# Patient Record
Sex: FEMALE | Race: WHITE | HISPANIC OR LATINO | Employment: UNEMPLOYED | URBAN - METROPOLITAN AREA
[De-identification: names, ages, dates, MRNs, and addresses within clinical notes are randomized per-mention and may not be internally consistent; named-entity substitution may affect disease eponyms.]

---

## 2024-01-02 ENCOUNTER — HOSPITAL ENCOUNTER (EMERGENCY)
Facility: HOSPITAL | Age: 29
Discharge: HOME | End: 2024-01-02
Payer: COMMERCIAL

## 2024-01-02 VITALS
OXYGEN SATURATION: 98 % | HEART RATE: 97 BPM | BODY MASS INDEX: 29.39 KG/M2 | DIASTOLIC BLOOD PRESSURE: 73 MMHG | WEIGHT: 140 LBS | HEIGHT: 58 IN | TEMPERATURE: 97 F | SYSTOLIC BLOOD PRESSURE: 110 MMHG | RESPIRATION RATE: 18 BRPM

## 2024-01-02 DIAGNOSIS — J10.1 INFLUENZA A: Primary | ICD-10-CM

## 2024-01-02 LAB
FLUAV RNA RESP QL NAA+PROBE: DETECTED
FLUBV RNA RESP QL NAA+PROBE: NOT DETECTED
SARS-COV-2 RNA RESP QL NAA+PROBE: NOT DETECTED

## 2024-01-02 PROCEDURE — 87636 SARSCOV2 & INF A&B AMP PRB: CPT | Performed by: PHYSICIAN ASSISTANT

## 2024-01-02 PROCEDURE — 99283 EMERGENCY DEPT VISIT LOW MDM: CPT

## 2024-01-02 PROCEDURE — 2500000005 HC RX 250 GENERAL PHARMACY W/O HCPCS: Performed by: PHYSICIAN ASSISTANT

## 2024-01-02 RX ORDER — ONDANSETRON 4 MG/1
4 TABLET, ORALLY DISINTEGRATING ORAL ONCE
Status: COMPLETED | OUTPATIENT
Start: 2024-01-02 | End: 2024-01-02

## 2024-01-02 RX ORDER — BENZONATATE 100 MG/1
100 CAPSULE ORAL EVERY 8 HOURS
Qty: 15 CAPSULE | Refills: 0 | Status: SHIPPED | OUTPATIENT
Start: 2024-01-02 | End: 2024-01-07

## 2024-01-02 RX ORDER — ACETAMINOPHEN 325 MG/1
650 TABLET ORAL ONCE
Status: COMPLETED | OUTPATIENT
Start: 2024-01-02 | End: 2024-01-02

## 2024-01-02 RX ORDER — ONDANSETRON 4 MG/1
4 TABLET, ORALLY DISINTEGRATING ORAL EVERY 8 HOURS PRN
Qty: 9 TABLET | Refills: 0 | Status: SHIPPED | OUTPATIENT
Start: 2024-01-02 | End: 2024-01-05

## 2024-01-02 RX ADMIN — ONDANSETRON 4 MG: 4 TABLET, ORALLY DISINTEGRATING ORAL at 22:11

## 2024-01-02 RX ADMIN — ACETAMINOPHEN 650 MG: 325 TABLET ORAL at 22:11

## 2024-01-02 ASSESSMENT — LIFESTYLE VARIABLES
HAVE PEOPLE ANNOYED YOU BY CRITICIZING YOUR DRINKING: NO
REASON UNABLE TO ASSESS: YES
HAVE YOU EVER FELT YOU SHOULD CUT DOWN ON YOUR DRINKING: NO
EVER HAD A DRINK FIRST THING IN THE MORNING TO STEADY YOUR NERVES TO GET RID OF A HANGOVER: NO
EVER FELT BAD OR GUILTY ABOUT YOUR DRINKING: NO

## 2024-01-02 ASSESSMENT — PAIN DESCRIPTION - DESCRIPTORS: DESCRIPTORS: BURNING

## 2024-01-02 ASSESSMENT — COLUMBIA-SUICIDE SEVERITY RATING SCALE - C-SSRS
6. HAVE YOU EVER DONE ANYTHING, STARTED TO DO ANYTHING, OR PREPARED TO DO ANYTHING TO END YOUR LIFE?: NO
1. IN THE PAST MONTH, HAVE YOU WISHED YOU WERE DEAD OR WISHED YOU COULD GO TO SLEEP AND NOT WAKE UP?: NO
2. HAVE YOU ACTUALLY HAD ANY THOUGHTS OF KILLING YOURSELF?: NO

## 2024-01-02 ASSESSMENT — PAIN - FUNCTIONAL ASSESSMENT: PAIN_FUNCTIONAL_ASSESSMENT: 0-10

## 2024-01-02 ASSESSMENT — PAIN DESCRIPTION - PAIN TYPE: TYPE: ACUTE PAIN

## 2024-01-02 ASSESSMENT — PAIN SCALES - GENERAL: PAINLEVEL_OUTOF10: 9

## 2024-01-02 ASSESSMENT — PAIN DESCRIPTION - LOCATION: LOCATION: THROAT

## 2024-01-02 NOTE — Clinical Note
Linnette Valencia was seen and treated in our emergency department on 1/2/2024.  She may return to work on 01/05/2024.       If you have any questions or concerns, please don't hesitate to call.      Linda Santa PA-C

## 2024-01-03 NOTE — ED PROVIDER NOTES
HPI   Chief Complaint   Patient presents with    Flu Symptoms     Body aches, sore throat, cough for 3 days.       28-year-old female, otherwise healthy not on any daily medication presenting to the ED today with 3-day history of fevers body aches and chills, congestion, sore throat and cough as well as some nausea and vomiting.  Her  is sick at home with the same symptoms.  They have not traveled anywhere recently, no foreign ingestion she can think of to cause any symptoms.  She tried taking some Tylenol earlier however she vomited.  She did have a headache earlier today to but this went away.  She denies chest pain or shortness of breath.  Denies abdominal pain or concerns for pregnancy.  No further complaints.      History provided by:  Patient                      [place] Coma Scale Score: 15                  Patient History   History reviewed. No pertinent past medical history.  History reviewed. No pertinent surgical history.  No family history on file.  Social History     Tobacco Use    Smoking status: Not on file    Smokeless tobacco: Not on file   Substance Use Topics    Alcohol use: Not on file    Drug use: Not on file       Physical Exam   ED Triage Vitals [01/02/24 2101]   Temp Heart Rate Resp BP   36.1 °C (97 °F) 97 18 147/83      SpO2 Temp Source Heart Rate Source Patient Position   100 % Temporal -- --      BP Location FiO2 (%)     -- --       Physical Exam  HENT:      Nose: Nose normal.      Mouth/Throat:      Mouth: Mucous membranes are moist.      Pharynx: Oropharynx is clear. Posterior oropharyngeal erythema present. No oropharyngeal exudate.      Comments: Uvula midline  Eyes:      Extraocular Movements: Extraocular movements intact.      Conjunctiva/sclera: Conjunctivae normal.      Pupils: Pupils are equal, round, and reactive to light.   Cardiovascular:      Rate and Rhythm: Normal rate and regular rhythm.      Pulses: Normal pulses.      Heart sounds: Normal heart sounds.   Pulmonary:       Effort: Pulmonary effort is normal.      Breath sounds: Normal breath sounds.   Abdominal:      General: Bowel sounds are normal. There is no distension.      Palpations: Abdomen is soft.      Tenderness: There is no abdominal tenderness. There is no guarding.   Musculoskeletal:      Cervical back: Normal range of motion and neck supple. No rigidity.      Comments: Normal gait and strength tone, no calf tenderness or swelling bilaterally   Skin:     General: Skin is warm.      Capillary Refill: Capillary refill takes less than 2 seconds.   Neurological:      Mental Status: She is alert and oriented to person, place, and time.         ED Course & MDM   Diagnoses as of 01/02/24 2210   Influenza A       Medical Decision Making  28-year-old female, otherwise healthy presenting to the ED today with fevers body aches chills, congestion and cough as well as some nausea and vomiting.  She had a headache earlier to that went away.  Her  is sick with the same symptoms.  No further complaints and she arrives afebrile with stable vital signs.  On my exam heart RRR, lungs are clear.  There is no nuchal rigidity.  Oropharynx with erythema but no edema or exudate uvula is midline.  No peripheral edema or calf tenderness and her exam is otherwise normal limits.  COVID flu and strep test o patient rdered as well as Zofran and Tylenol.    Patient is positive for influenza A today.  Negative for COVID.  I did discuss these results with her, diagnosis and treatment plan home-going.  Her symptoms have been going on 3 days so she is out of the window for Tamiflu.  I discussed Tylenol and Motrin use for fevers aches and pains and we will prescribe Zofran and Tessalon Perles to help with her nausea and cough.  I did discuss rest, plenty of fluids and the need for follow-up but I also outlined warning signs to return to the ER and she expressed understanding and agreed with the plan of care today.      Labs Reviewed   INFLUENZA  A AND B PCR - Abnormal       Result Value    Flu A Result Detected (*)     Flu B Result Not Detected      Narrative:     This assay is an in vitro diagnostic multiplex nucleic acid amplification test for the detection and discrimination of Influenza A & B from nasopharyngeal specimens, and has been validated for use at [facility]. Negative results do not preclude Influenza A/B infections, and should not be used as the sole basis for diagnosis, treatment, or other management decisions. If Influenza A/B and RSV PCR results are negative, testing for Parainfluenza virus, Adenovirus and Metapneumovirus is routinely performed for [facility] pediatric oncology and intensive care inpatients, and is available on other patients by placing an add-on request.   SARS-COV-2 PCR, SYMPTOMATIC - Normal    Coronavirus 2019, PCR Not Detected      Narrative:     This assay has received FDA Emergency Use Authorization (EUA) and is only authorized for the duration of time that circumstances exist to justify the authorization of the emergency use of in vitro diagnostic tests for the detection of SARS-CoV-2 virus and/or diagnosis of COVID-19 infection under section 564(b)(1) of the Act, 21 U.S.C. 360bbb-3(b)(1). This assay is an in vitro diagnostic nucleic acid amplification test for the qualitative detection of SARS-CoV-2 from nasopharyngeal specimens and has been validated for use at [facility]. Negative results do not preclude COVID-19 infections and should not be used as the sole basis for diagnosis, treatment, or other management decisions.         No orders to display         Procedure  Procedures     Linda Santa PA-C  01/02/24 2215

## 2024-06-10 ENCOUNTER — APPOINTMENT (OUTPATIENT)
Dept: RADIOLOGY | Facility: HOSPITAL | Age: 29
End: 2024-06-10
Payer: COMMERCIAL

## 2024-06-10 ENCOUNTER — HOSPITAL ENCOUNTER (EMERGENCY)
Facility: HOSPITAL | Age: 29
Discharge: HOME | End: 2024-06-11
Payer: COMMERCIAL

## 2024-06-10 DIAGNOSIS — M25.561 ACUTE PAIN OF RIGHT KNEE: Primary | ICD-10-CM

## 2024-06-10 PROCEDURE — 99284 EMERGENCY DEPT VISIT MOD MDM: CPT | Mod: 25

## 2024-06-10 PROCEDURE — 73560 X-RAY EXAM OF KNEE 1 OR 2: CPT | Mod: RIGHT SIDE | Performed by: RADIOLOGY

## 2024-06-10 PROCEDURE — 93971 EXTREMITY STUDY: CPT | Mod: FOREIGN READ | Performed by: RADIOLOGY

## 2024-06-10 PROCEDURE — 73560 X-RAY EXAM OF KNEE 1 OR 2: CPT | Mod: RT

## 2024-06-10 PROCEDURE — 93971 EXTREMITY STUDY: CPT

## 2024-06-10 RX ORDER — ACETAMINOPHEN 325 MG/1
975 TABLET ORAL ONCE
Status: COMPLETED | OUTPATIENT
Start: 2024-06-10 | End: 2024-06-10

## 2024-06-10 RX ADMIN — ACETAMINOPHEN 975 MG: 325 TABLET ORAL at 22:47

## 2024-06-10 ASSESSMENT — PAIN DESCRIPTION - LOCATION: LOCATION: KNEE

## 2024-06-10 ASSESSMENT — LIFESTYLE VARIABLES
HAVE PEOPLE ANNOYED YOU BY CRITICIZING YOUR DRINKING: NO
EVER FELT BAD OR GUILTY ABOUT YOUR DRINKING: NO
EVER HAD A DRINK FIRST THING IN THE MORNING TO STEADY YOUR NERVES TO GET RID OF A HANGOVER: NO
TOTAL SCORE: 0
HAVE YOU EVER FELT YOU SHOULD CUT DOWN ON YOUR DRINKING: NO

## 2024-06-10 ASSESSMENT — PAIN DESCRIPTION - PAIN TYPE
TYPE: ACUTE PAIN
TYPE: ACUTE PAIN

## 2024-06-10 ASSESSMENT — PAIN - FUNCTIONAL ASSESSMENT
PAIN_FUNCTIONAL_ASSESSMENT: 0-10
PAIN_FUNCTIONAL_ASSESSMENT: 0-10

## 2024-06-10 ASSESSMENT — PAIN DESCRIPTION - DESCRIPTORS
DESCRIPTORS: ACHING
DESCRIPTORS: ACHING

## 2024-06-10 ASSESSMENT — COLUMBIA-SUICIDE SEVERITY RATING SCALE - C-SSRS
1. IN THE PAST MONTH, HAVE YOU WISHED YOU WERE DEAD OR WISHED YOU COULD GO TO SLEEP AND NOT WAKE UP?: NO
2. HAVE YOU ACTUALLY HAD ANY THOUGHTS OF KILLING YOURSELF?: NO
6. HAVE YOU EVER DONE ANYTHING, STARTED TO DO ANYTHING, OR PREPARED TO DO ANYTHING TO END YOUR LIFE?: NO

## 2024-06-10 ASSESSMENT — PAIN SCALES - GENERAL
PAINLEVEL_OUTOF10: 7
PAINLEVEL_OUTOF10: 5 - MODERATE PAIN

## 2024-06-10 ASSESSMENT — PAIN DESCRIPTION - ORIENTATION: ORIENTATION: RIGHT

## 2024-06-10 ASSESSMENT — PAIN DESCRIPTION - ONSET: ONSET: ONGOING

## 2024-06-10 ASSESSMENT — PAIN DESCRIPTION - FREQUENCY: FREQUENCY: CONSTANT/CONTINUOUS

## 2024-06-10 ASSESSMENT — PAIN DESCRIPTION - PROGRESSION
CLINICAL_PROGRESSION: GRADUALLY WORSENING
CLINICAL_PROGRESSION: NOT CHANGED

## 2024-06-10 NOTE — Clinical Note
Linnette Valencia was seen and treated in our emergency department on 6/10/2024.  She may return to work on 06/12/2024.       If you have any questions or concerns, please don't hesitate to call.      Linda Santa PA-C

## 2024-06-11 VITALS
SYSTOLIC BLOOD PRESSURE: 108 MMHG | HEART RATE: 75 BPM | WEIGHT: 145 LBS | OXYGEN SATURATION: 99 % | TEMPERATURE: 97.5 F | DIASTOLIC BLOOD PRESSURE: 61 MMHG | RESPIRATION RATE: 18 BRPM | BODY MASS INDEX: 30.44 KG/M2 | HEIGHT: 58 IN

## 2024-06-11 ASSESSMENT — PAIN SCALES - GENERAL: PAINLEVEL_OUTOF10: 0 - NO PAIN

## 2024-06-11 NOTE — ED PROVIDER NOTES
HPI   Chief Complaint   Patient presents with    Knee Pain     Right knee pain since yesterday that radiates to calf. Pt states she has a bruise near knee, but does not recall hitting or injuring it.       28-year-old female, otherwise healthy not on any current daily medication presenting to the ER today with pain in her right calf that started last night before bed.  Patient states there was no fall or injury, she did not do any new workouts or heavy lifting.  She states that before bed she noticed she had some pain in her right calf and noticed that there was a little bit of a bruise there.  She went to bed and woke up this morning and now has pain behind her right knee into the front of her right knee.  She states that the pain is worse when she touches the area or moves it or walks on it.  She did not have any fall or injury.  The pain does not go up into her thigh or towards her hip and the pain does not go down into her ankle or foot.  She is able to walk on it and move it, denies numbness or weakness or paresthesias.  She has not had any medicine today for relief of pain.  She states her knee looks a little bit swollen but she denies redness.  She has not any fevers chills or bodyaches.  She denies history of DVT or PE and denies recent travel or surgery immobilization.  No further complaints.      History provided by:  Patient                      [place] Coma Scale Score: 15                     Patient History   History reviewed. No pertinent past medical history.  History reviewed. No pertinent surgical history.  No family history on file.  Social History     Tobacco Use    Smoking status: Never     Passive exposure: Never    Smokeless tobacco: Never   Vaping Use    Vaping status: Never Used   Substance Use Topics    Alcohol use: Never    Drug use: Never       Physical Exam   ED Triage Vitals [06/10/24 2232]   Temperature Heart Rate Respirations BP   36.4 °C (97.5 °F) 89 18 104/63      Pulse Ox Temp  Source Heart Rate Source Patient Position   99 % Temporal Monitor Sitting      BP Location FiO2 (%)     Right arm --       Physical Exam  Constitutional:       General: She is not in acute distress.  Eyes:      Conjunctiva/sclera: Conjunctivae normal.   Cardiovascular:      Comments: No peripheral edema.  Distal pulses intact.  Musculoskeletal:      Comments: FROM right lower extremity without obvious bony deformity or dislocation.  Tenderness diffusely along the anterior surface of the right knee with mild swelling but no ecchymosis or erythema.  Negative anterior/posterior drawer sign.  No popliteal tenderness on my palpation.  Tenderness to the right calf with faint ecchymosis at the proximal medial portion of the calf.  No erythema.  No further tenderness throughout the right lower extremity. NVI   Skin:     General: Skin is warm.      Capillary Refill: Capillary refill takes less than 2 seconds.   Neurological:      Mental Status: She is alert and oriented to person, place, and time.         ED Course & MDM   Diagnoses as of 06/11/24 0107   Acute pain of right knee       Medical Decision Making  28-year-old female, otherwise healthy presenting to the ER today with pain in her right calf that started yesterday before bed, pain in the right knee that she woke up with this morning.  There was no fall or injury and she has not had any medicine for relief of the pain.  She is not having any chest pain or shortness of breath, no fevers chills or bodyaches.  No numbness weakness or paresthesias.  She does not have history of DVT or PE.  She arrives afebrile with stable vital signs.  She is resting on exam without signs of acute distress.  She is tender along the anterior surface of the right knee as well as into the right calf.  There is faint ecchymosis at the medial proximal portion of the calf but no obvious signs of trauma or infection.  She is otherwise neurovascularly intact.  Tylenol is ordered as well as x-ray  and ultrasound.    Ultrasound shows no evidence of DVT in the right lower extremity.  X-ray of the knee today shows no acute osseous abnormality.  On repeat assessment patient is resting comfortably.  I did discuss these results with the patient.  She does have a little bit of bruising to the top of the medial portion of her left calf that is very faint.  I did discuss possibility of some sort of trauma however she still denies this.  She does not have any other signs of bruising and she does not have easy bleeding she states.  I did discuss with the patient that at this time she will be discharged home and she can do RICE therapy as well as use Tylenol for pain relief and we will have her follow-up with her PCP and with Ortho.  I did also outline warning signs to return to the ED and she expressed understanding and agreed with the plan of care today.    Labs Reviewed - No data to display    Lower extremity venous duplex right   Final Result   No evidence for DVT within the right lower extremity.   Signed by Jomar Yates,       XR knee right 1-2 views   Final Result   No findings of an acute process.   Signed by Carson Fowler MD            Procedure  Procedures     Linda Santa PA-C  06/11/24 0110

## 2024-06-12 NOTE — ED NOTES
Attempted to call patient regarding their visit to the ED. The call was sent to [employer] and a message was left.       Genevieve Rendon LPN  06/12/24 7661

## 2025-03-28 ENCOUNTER — APPOINTMENT (OUTPATIENT)
Dept: PRIMARY CARE | Facility: CLINIC | Age: 30
End: 2025-03-28
Payer: COMMERCIAL

## 2025-03-28 PROBLEM — F33.0 RECURRENT MILD MAJOR DEPRESSIVE DISORDER WITH ANXIETY: Status: ACTIVE | Noted: 2024-02-01

## 2025-03-28 PROBLEM — F41.9 RECURRENT MILD MAJOR DEPRESSIVE DISORDER WITH ANXIETY: Status: ACTIVE | Noted: 2024-02-01

## 2025-03-28 PROBLEM — F41.9 ANXIETY: Status: ACTIVE | Noted: 2018-10-12

## 2025-04-09 ENCOUNTER — APPOINTMENT (OUTPATIENT)
Dept: PRIMARY CARE | Facility: CLINIC | Age: 30
End: 2025-04-09
Payer: COMMERCIAL